# Patient Record
Sex: MALE | Race: WHITE | Employment: FULL TIME | URBAN - METROPOLITAN AREA
[De-identification: names, ages, dates, MRNs, and addresses within clinical notes are randomized per-mention and may not be internally consistent; named-entity substitution may affect disease eponyms.]

---

## 2017-02-27 ENCOUNTER — TELEPHONE (OUTPATIENT)
Dept: INTERNAL MEDICINE CLINIC | Age: 57
End: 2017-02-27

## 2017-02-27 DIAGNOSIS — R97.20 ELEVATED PSA: ICD-10-CM

## 2017-02-27 DIAGNOSIS — Z79.899 ENCOUNTER FOR LONG-TERM (CURRENT) USE OF MEDICATIONS: ICD-10-CM

## 2017-02-27 DIAGNOSIS — Z51.81 ENCOUNTER FOR THERAPEUTIC DRUG MONITORING: ICD-10-CM

## 2017-02-27 DIAGNOSIS — N40.0 PROSTATE HYPERTROPHY: ICD-10-CM

## 2017-02-27 DIAGNOSIS — E78.2 MIXED HYPERLIPIDEMIA: Primary | ICD-10-CM

## 2017-03-09 LAB
A/G RATIO: 1.7 (CALC) (ref 0.8–2.6)
ALBUMIN SERPL-MCNC: 4.5 GM/DL (ref 3.5–5.2)
ALP BLD-CCNC: 63 U/L (ref 23–144)
ALT SERPL-CCNC: 22 U/L (ref 0–60)
AST SERPL-CCNC: 22 U/L (ref 0–46)
BILIRUB SERPL-MCNC: 0.6 MG/DL (ref 0–1.2)
BILIRUBIN DIRECT: 0.1 MG/DL (ref 0–0.4)
BILIRUBIN, INDIRECT: 0.5 MG/DL (CALC) (ref 0–1.2)
BUN / CREAT RATIO: 22 (CALC) (ref 7–25)
BUN BLDV-MCNC: 24 MG/DL (ref 3–29)
CALCIUM SERPL-MCNC: 9.3 MG/DL (ref 8.5–10.5)
CHLORIDE BLD-SCNC: 103 MEQ/L (ref 96–110)
CHOLESTEROL, TOTAL: 213 MG/DL
CO2: 30 MEQ/L (ref 19–32)
CREAT SERPL-MCNC: 1.1 MG/DL
GFR SERPL CREATININE-BSD FRML MDRD: 74 ML/MIN/1.73M2
GLOBULIN: 2.6 GM/DL (CALC) (ref 1.9–3.6)
GLUCOSE BLD-MCNC: 95 MG/DL
HDLC SERPL-MCNC: 47 MG/DL
LDL CHOLESTEROL: 146 MG/DL (CALC)
POTASSIUM SERPL-SCNC: 4.7 MEQ/L (ref 3.4–5.3)
SODIUM BLD-SCNC: 145 MEQ/L (ref 135–148)
TOTAL CK: 117 U/L (ref 0–250)
TOTAL PROTEIN: 7.1 GM/DL (ref 6–8.3)
TRIGL SERPL-MCNC: 101 MG/DL
VLDLC SERPL CALC-MCNC: 20 MG/DL (CALC) (ref 4–38)

## 2017-03-14 ENCOUNTER — TELEPHONE (OUTPATIENT)
Dept: INTERNAL MEDICINE CLINIC | Age: 57
End: 2017-03-14

## 2017-03-15 ENCOUNTER — OFFICE VISIT (OUTPATIENT)
Dept: INTERNAL MEDICINE CLINIC | Age: 57
End: 2017-03-15

## 2017-03-15 VITALS
WEIGHT: 207.2 LBS | HEART RATE: 60 BPM | DIASTOLIC BLOOD PRESSURE: 72 MMHG | SYSTOLIC BLOOD PRESSURE: 118 MMHG | RESPIRATION RATE: 12 BRPM | BODY MASS INDEX: 29.73 KG/M2

## 2017-03-15 DIAGNOSIS — Z23 NEED FOR TDAP VACCINATION: ICD-10-CM

## 2017-03-15 DIAGNOSIS — N40.0 PROSTATE HYPERTROPHY: ICD-10-CM

## 2017-03-15 DIAGNOSIS — E78.2 MIXED HYPERLIPIDEMIA: Primary | ICD-10-CM

## 2017-03-15 PROCEDURE — 90715 TDAP VACCINE 7 YRS/> IM: CPT | Performed by: INTERNAL MEDICINE

## 2017-03-15 PROCEDURE — 90471 IMMUNIZATION ADMIN: CPT | Performed by: INTERNAL MEDICINE

## 2017-03-15 PROCEDURE — 99213 OFFICE O/P EST LOW 20 MIN: CPT | Performed by: INTERNAL MEDICINE

## 2017-03-15 RX ORDER — ROSUVASTATIN CALCIUM 10 MG/1
10 TABLET, COATED ORAL DAILY
Qty: 30 TABLET | Refills: 5 | Status: SHIPPED | OUTPATIENT
Start: 2017-03-15 | End: 2017-09-12 | Stop reason: SDUPTHER

## 2017-03-15 ASSESSMENT — PATIENT HEALTH QUESTIONNAIRE - PHQ9
2. FEELING DOWN, DEPRESSED OR HOPELESS: 0
SUM OF ALL RESPONSES TO PHQ9 QUESTIONS 1 & 2: 0
SUM OF ALL RESPONSES TO PHQ QUESTIONS 1-9: 0
1. LITTLE INTEREST OR PLEASURE IN DOING THINGS: 0

## 2017-03-16 RX ORDER — ACETAMINOPHEN 160 MG
TABLET,DISINTEGRATING ORAL DAILY
COMMUNITY

## 2017-05-03 ENCOUNTER — TELEPHONE (OUTPATIENT)
Dept: INTERNAL MEDICINE CLINIC | Age: 57
End: 2017-05-03

## 2017-05-04 RX ORDER — AZITHROMYCIN 250 MG/1
TABLET, FILM COATED ORAL
Qty: 1 PACKET | Refills: 0 | Status: SHIPPED | OUTPATIENT
Start: 2017-05-04 | End: 2017-05-14

## 2017-05-04 RX ORDER — METHYLPREDNISOLONE 4 MG/1
TABLET ORAL
Qty: 1 KIT | Refills: 0 | Status: SHIPPED | OUTPATIENT
Start: 2017-05-04 | End: 2017-05-10

## 2017-08-28 ENCOUNTER — TELEPHONE (OUTPATIENT)
Dept: INTERNAL MEDICINE CLINIC | Age: 57
End: 2017-08-28

## 2017-08-29 ENCOUNTER — OFFICE VISIT (OUTPATIENT)
Dept: INTERNAL MEDICINE CLINIC | Age: 57
End: 2017-08-29

## 2017-08-29 VITALS
BODY MASS INDEX: 29.35 KG/M2 | DIASTOLIC BLOOD PRESSURE: 88 MMHG | HEIGHT: 70 IN | RESPIRATION RATE: 12 BRPM | SYSTOLIC BLOOD PRESSURE: 110 MMHG | WEIGHT: 205 LBS | HEART RATE: 64 BPM

## 2017-08-29 DIAGNOSIS — Z00.00 ANNUAL PHYSICAL EXAM: Primary | ICD-10-CM

## 2017-08-29 DIAGNOSIS — R00.2 PALPITATIONS: ICD-10-CM

## 2017-08-29 DIAGNOSIS — Z13.6 ENCOUNTER FOR ABDOMINAL AORTIC ANEURYSM (AAA) SCREENING: ICD-10-CM

## 2017-08-29 DIAGNOSIS — E55.9 VITAMIN D DEFICIENCY: ICD-10-CM

## 2017-08-29 LAB
A/G RATIO: 1.5 (ref 1.1–2.2)
ALBUMIN SERPL-MCNC: 4.2 G/DL (ref 3.4–5)
ALP BLD-CCNC: 66 U/L (ref 40–129)
ALT SERPL-CCNC: 27 U/L (ref 10–40)
ANION GAP SERPL CALCULATED.3IONS-SCNC: 18 MMOL/L (ref 3–16)
AST SERPL-CCNC: 21 U/L (ref 15–37)
BASOPHILS ABSOLUTE: 0 K/UL (ref 0–0.2)
BASOPHILS RELATIVE PERCENT: 0.8 %
BILIRUB SERPL-MCNC: 0.5 MG/DL (ref 0–1)
BILIRUBIN URINE: NEGATIVE
BLOOD, URINE: NEGATIVE
BUN BLDV-MCNC: 22 MG/DL (ref 7–20)
C-REACTIVE PROTEIN: 0.4 MG/L (ref 0–5.1)
CALCIUM SERPL-MCNC: 9.2 MG/DL (ref 8.3–10.6)
CHLORIDE BLD-SCNC: 101 MMOL/L (ref 99–110)
CHOLESTEROL, TOTAL: 205 MG/DL (ref 0–199)
CLARITY: CLEAR
CO2: 24 MMOL/L (ref 21–32)
COLOR: YELLOW
CREAT SERPL-MCNC: 1 MG/DL (ref 0.9–1.3)
EOSINOPHILS ABSOLUTE: 0.1 K/UL (ref 0–0.6)
EOSINOPHILS RELATIVE PERCENT: 2.3 %
EPITHELIAL CELLS, UA: 0 /HPF (ref 0–5)
GFR AFRICAN AMERICAN: >60
GFR NON-AFRICAN AMERICAN: >60
GLOBULIN: 2.8 G/DL
GLUCOSE BLD-MCNC: 89 MG/DL (ref 70–99)
GLUCOSE URINE: NEGATIVE MG/DL
HCT VFR BLD CALC: 45.9 % (ref 40.5–52.5)
HDLC SERPL-MCNC: 50 MG/DL (ref 40–60)
HEMOGLOBIN: 15.1 G/DL (ref 13.5–17.5)
HYALINE CASTS: 1 /LPF (ref 0–8)
KETONES, URINE: NEGATIVE MG/DL
LDL CHOLESTEROL CALCULATED: 137 MG/DL
LEUKOCYTE ESTERASE, URINE: NEGATIVE
LYMPHOCYTES ABSOLUTE: 1.6 K/UL (ref 1–5.1)
LYMPHOCYTES RELATIVE PERCENT: 27.9 %
MCH RBC QN AUTO: 30.8 PG (ref 26–34)
MCHC RBC AUTO-ENTMCNC: 33 G/DL (ref 31–36)
MCV RBC AUTO: 93.4 FL (ref 80–100)
MICROSCOPIC EXAMINATION: NORMAL
MONOCYTES ABSOLUTE: 0.4 K/UL (ref 0–1.3)
MONOCYTES RELATIVE PERCENT: 7.7 %
NEUTROPHILS ABSOLUTE: 3.5 K/UL (ref 1.7–7.7)
NEUTROPHILS RELATIVE PERCENT: 61.3 %
NITRITE, URINE: NEGATIVE
PDW BLD-RTO: 13.7 % (ref 12.4–15.4)
PH UA: 6
PLATELET # BLD: 195 K/UL (ref 135–450)
PMV BLD AUTO: 9.3 FL (ref 5–10.5)
POTASSIUM SERPL-SCNC: 4 MMOL/L (ref 3.5–5.1)
PROSTATE SPECIFIC ANTIGEN: 5.22 NG/ML (ref 0–4)
PROTEIN UA: NEGATIVE MG/DL
RBC # BLD: 4.92 M/UL (ref 4.2–5.9)
RBC UA: 2 /HPF (ref 0–4)
SODIUM BLD-SCNC: 143 MMOL/L (ref 136–145)
SPECIFIC GRAVITY UA: 1.02
TOTAL CK: 93 U/L (ref 39–308)
TOTAL PROTEIN: 7 G/DL (ref 6.4–8.2)
TRIGL SERPL-MCNC: 92 MG/DL (ref 0–150)
TSH SERPL DL<=0.05 MIU/L-ACNC: 0.56 UIU/ML (ref 0.27–4.2)
UROBILINOGEN, URINE: 0.2 E.U./DL
VITAMIN D 25-HYDROXY: 32.8 NG/ML
VLDLC SERPL CALC-MCNC: 18 MG/DL
WBC # BLD: 5.6 K/UL (ref 4–11)
WBC UA: 1 /HPF (ref 0–5)

## 2017-08-29 PROCEDURE — 36415 COLL VENOUS BLD VENIPUNCTURE: CPT | Performed by: INTERNAL MEDICINE

## 2017-08-29 PROCEDURE — 93000 ELECTROCARDIOGRAM COMPLETE: CPT | Performed by: INTERNAL MEDICINE

## 2017-08-29 PROCEDURE — 99396 PREV VISIT EST AGE 40-64: CPT | Performed by: INTERNAL MEDICINE

## 2017-08-29 PROCEDURE — 81001 URINALYSIS AUTO W/SCOPE: CPT | Performed by: INTERNAL MEDICINE

## 2017-08-30 LAB
ESTIMATED AVERAGE GLUCOSE: 111.2 MG/DL
HBA1C MFR BLD: 5.5 %

## 2017-09-11 ENCOUNTER — TELEPHONE (OUTPATIENT)
Dept: INTERNAL MEDICINE CLINIC | Age: 57
End: 2017-09-11

## 2017-09-12 ENCOUNTER — OFFICE VISIT (OUTPATIENT)
Dept: INTERNAL MEDICINE CLINIC | Age: 57
End: 2017-09-12

## 2017-09-12 VITALS — DIASTOLIC BLOOD PRESSURE: 70 MMHG | SYSTOLIC BLOOD PRESSURE: 110 MMHG | HEART RATE: 74 BPM

## 2017-09-12 DIAGNOSIS — E78.2 MIXED HYPERLIPIDEMIA: ICD-10-CM

## 2017-09-12 DIAGNOSIS — R97.20 ELEVATED PROSTATE SPECIFIC ANTIGEN (PSA): Primary | ICD-10-CM

## 2017-09-12 DIAGNOSIS — G47.33 OBSTRUCTIVE SLEEP APNEA SYNDROME: ICD-10-CM

## 2017-09-12 PROCEDURE — 36415 COLL VENOUS BLD VENIPUNCTURE: CPT | Performed by: INTERNAL MEDICINE

## 2017-09-12 PROCEDURE — 99213 OFFICE O/P EST LOW 20 MIN: CPT | Performed by: INTERNAL MEDICINE

## 2017-09-12 RX ORDER — ROSUVASTATIN CALCIUM 10 MG/1
10 TABLET, COATED ORAL DAILY
Qty: 30 TABLET | Refills: 5 | Status: SHIPPED | OUTPATIENT
Start: 2017-09-12 | End: 2018-07-31 | Stop reason: SDUPTHER

## 2017-09-13 LAB — PROSTATE SPECIFIC ANTIGEN: 4.27 NG/ML (ref 0–4)

## 2017-09-19 ENCOUNTER — TELEPHONE (OUTPATIENT)
Dept: INTERNAL MEDICINE CLINIC | Age: 57
End: 2017-09-19

## 2017-09-19 DIAGNOSIS — R97.20 ELEVATED PROSTATE SPECIFIC ANTIGEN (PSA): Primary | ICD-10-CM

## 2017-09-26 ENCOUNTER — HOSPITAL ENCOUNTER (OUTPATIENT)
Dept: ULTRASOUND IMAGING | Age: 57
Discharge: HOME | End: 2017-09-26
Attending: INTERNAL MEDICINE | Admitting: INTERNAL MEDICINE

## 2017-09-26 DIAGNOSIS — Z13.6 SCREENING FOR ABDOMINAL AORTIC ANEURYSM (AAA) PERFORMED: ICD-10-CM

## 2018-01-12 ENCOUNTER — TELEPHONE (OUTPATIENT)
Dept: INTERNAL MEDICINE CLINIC | Age: 58
End: 2018-01-12

## 2018-01-12 RX ORDER — OSELTAMIVIR PHOSPHATE 75 MG/1
75 CAPSULE ORAL 2 TIMES DAILY
Qty: 10 CAPSULE | Refills: 0 | Status: SHIPPED | OUTPATIENT
Start: 2018-01-12 | End: 2018-01-17

## 2018-02-02 ENCOUNTER — TELEPHONE (OUTPATIENT)
Dept: INTERNAL MEDICINE CLINIC | Age: 58
End: 2018-02-02

## 2018-02-02 RX ORDER — AZITHROMYCIN 250 MG/1
TABLET, FILM COATED ORAL
Qty: 1 PACKET | Refills: 0 | Status: SHIPPED | OUTPATIENT
Start: 2018-02-02 | End: 2018-02-12

## 2018-02-02 RX ORDER — METHYLPREDNISOLONE 4 MG/1
TABLET ORAL
Qty: 1 KIT | Refills: 0 | Status: SHIPPED | OUTPATIENT
Start: 2018-02-02 | End: 2018-02-08

## 2018-02-02 NOTE — TELEPHONE ENCOUNTER
Wife called and stated that she thinks Neena Briones needs a zpac and something for his cough.     [place]

## 2018-02-28 DIAGNOSIS — Z79.899 ENCOUNTER FOR LONG-TERM (CURRENT) USE OF MEDICATIONS: ICD-10-CM

## 2018-02-28 DIAGNOSIS — E78.2 MIXED HYPERLIPIDEMIA: Primary | ICD-10-CM

## 2018-02-28 DIAGNOSIS — D50.9 IRON DEFICIENCY ANEMIA, UNSPECIFIED IRON DEFICIENCY ANEMIA TYPE: ICD-10-CM

## 2018-04-04 ENCOUNTER — TELEPHONE (OUTPATIENT)
Dept: INTERNAL MEDICINE CLINIC | Age: 58
End: 2018-04-04

## 2018-04-04 RX ORDER — AZITHROMYCIN 250 MG/1
TABLET, FILM COATED ORAL
Qty: 1 PACKET | Refills: 0 | Status: SHIPPED | OUTPATIENT
Start: 2018-04-04 | End: 2018-04-14

## 2018-04-05 ENCOUNTER — OFFICE VISIT (OUTPATIENT)
Dept: INTERNAL MEDICINE CLINIC | Age: 58
End: 2018-04-05

## 2018-04-05 VITALS
BODY MASS INDEX: 29.82 KG/M2 | DIASTOLIC BLOOD PRESSURE: 68 MMHG | SYSTOLIC BLOOD PRESSURE: 110 MMHG | HEART RATE: 60 BPM | WEIGHT: 207.8 LBS

## 2018-04-05 DIAGNOSIS — J98.01 BRONCHOSPASM: ICD-10-CM

## 2018-04-05 DIAGNOSIS — J40 BRONCHITIS: Primary | ICD-10-CM

## 2018-04-05 DIAGNOSIS — E78.2 MIXED HYPERLIPIDEMIA: ICD-10-CM

## 2018-04-05 DIAGNOSIS — R97.20 ELEVATED PROSTATE SPECIFIC ANTIGEN (PSA): ICD-10-CM

## 2018-04-05 PROCEDURE — 99213 OFFICE O/P EST LOW 20 MIN: CPT | Performed by: INTERNAL MEDICINE

## 2018-04-05 RX ORDER — METHYLPREDNISOLONE 4 MG/1
TABLET ORAL
Qty: 1 KIT | Refills: 0 | Status: SHIPPED | OUTPATIENT
Start: 2018-04-05 | End: 2018-04-11

## 2018-04-05 ASSESSMENT — PATIENT HEALTH QUESTIONNAIRE - PHQ9
SUM OF ALL RESPONSES TO PHQ9 QUESTIONS 1 & 2: 0
1. LITTLE INTEREST OR PLEASURE IN DOING THINGS: 0
SUM OF ALL RESPONSES TO PHQ QUESTIONS 1-9: 0
2. FEELING DOWN, DEPRESSED OR HOPELESS: 0

## 2018-07-31 ENCOUNTER — TELEPHONE (OUTPATIENT)
Dept: INTERNAL MEDICINE CLINIC | Age: 58
End: 2018-07-31

## 2018-07-31 RX ORDER — ROSUVASTATIN CALCIUM 10 MG/1
10 TABLET, COATED ORAL DAILY
Qty: 90 TABLET | Refills: 3 | Status: SHIPPED | OUTPATIENT
Start: 2018-07-31 | End: 2019-11-01 | Stop reason: SDUPTHER

## 2018-07-31 NOTE — TELEPHONE ENCOUNTER
Patient needs refill on crestor sent to [facility] on [facility].

## 2018-10-25 ENCOUNTER — OFFICE VISIT (OUTPATIENT)
Dept: INTERNAL MEDICINE CLINIC | Age: 58
End: 2018-10-25
Payer: COMMERCIAL

## 2018-10-25 VITALS
SYSTOLIC BLOOD PRESSURE: 118 MMHG | HEART RATE: 70 BPM | HEIGHT: 70 IN | BODY MASS INDEX: 29.78 KG/M2 | WEIGHT: 208 LBS | DIASTOLIC BLOOD PRESSURE: 70 MMHG

## 2018-10-25 DIAGNOSIS — E55.9 VITAMIN D DEFICIENCY: ICD-10-CM

## 2018-10-25 DIAGNOSIS — Z00.00 ANNUAL PHYSICAL EXAM: Primary | ICD-10-CM

## 2018-10-25 DIAGNOSIS — R00.2 PALPITATIONS: ICD-10-CM

## 2018-10-25 DIAGNOSIS — Z23 NEED FOR PROPHYLACTIC VACCINATION AND INOCULATION AGAINST VARICELLA: ICD-10-CM

## 2018-10-25 LAB
A/G RATIO: 1.6 (ref 1.1–2.2)
ALBUMIN SERPL-MCNC: 4.5 G/DL (ref 3.4–5)
ALP BLD-CCNC: 67 U/L (ref 40–129)
ALT SERPL-CCNC: 22 U/L (ref 10–40)
ANION GAP SERPL CALCULATED.3IONS-SCNC: 15 MMOL/L (ref 3–16)
AST SERPL-CCNC: 21 U/L (ref 15–37)
BASOPHILS ABSOLUTE: 0.1 K/UL (ref 0–0.2)
BASOPHILS RELATIVE PERCENT: 1 %
BILIRUB SERPL-MCNC: 0.4 MG/DL (ref 0–1)
BILIRUBIN URINE: NEGATIVE
BLOOD, URINE: NEGATIVE
BUN BLDV-MCNC: 19 MG/DL (ref 7–20)
CALCIUM SERPL-MCNC: 9.6 MG/DL (ref 8.3–10.6)
CHLORIDE BLD-SCNC: 104 MMOL/L (ref 99–110)
CHOLESTEROL, TOTAL: 180 MG/DL (ref 0–199)
CLARITY: CLEAR
CO2: 26 MMOL/L (ref 21–32)
COLOR: YELLOW
CREAT SERPL-MCNC: 1.1 MG/DL (ref 0.8–1.3)
EOSINOPHILS ABSOLUTE: 0.4 K/UL (ref 0–0.6)
EOSINOPHILS RELATIVE PERCENT: 7 %
EPITHELIAL CELLS, UA: 0 /HPF (ref 0–5)
GFR AFRICAN AMERICAN: >60
GFR NON-AFRICAN AMERICAN: >60
GLOBULIN: 2.8 G/DL
GLUCOSE BLD-MCNC: 104 MG/DL (ref 70–99)
GLUCOSE URINE: NEGATIVE MG/DL
HCT VFR BLD CALC: 46.4 % (ref 40.5–52.5)
HDLC SERPL-MCNC: 49 MG/DL (ref 40–60)
HEMOGLOBIN: 15.4 G/DL (ref 13.5–17.5)
HYALINE CASTS: 0 /LPF (ref 0–8)
KETONES, URINE: NEGATIVE MG/DL
LDL CHOLESTEROL CALCULATED: 116 MG/DL
LEUKOCYTE ESTERASE, URINE: NEGATIVE
LYMPHOCYTES ABSOLUTE: 1.4 K/UL (ref 1–5.1)
LYMPHOCYTES RELATIVE PERCENT: 24.6 %
MCH RBC QN AUTO: 31.4 PG (ref 26–34)
MCHC RBC AUTO-ENTMCNC: 33.1 G/DL (ref 31–36)
MCV RBC AUTO: 95 FL (ref 80–100)
MICROSCOPIC EXAMINATION: YES
MONOCYTES ABSOLUTE: 0.4 K/UL (ref 0–1.3)
MONOCYTES RELATIVE PERCENT: 7.8 %
NEUTROPHILS ABSOLUTE: 3.4 K/UL (ref 1.7–7.7)
NEUTROPHILS RELATIVE PERCENT: 59.6 %
NITRITE, URINE: NEGATIVE
PDW BLD-RTO: 13.5 % (ref 12.4–15.4)
PH UA: 6
PLATELET # BLD: 169 K/UL (ref 135–450)
PMV BLD AUTO: 9.9 FL (ref 5–10.5)
POTASSIUM SERPL-SCNC: 4.5 MMOL/L (ref 3.5–5.1)
PROSTATE SPECIFIC ANTIGEN: 3.41 NG/ML (ref 0–4)
PROTEIN UA: ABNORMAL MG/DL
RBC # BLD: 4.89 M/UL (ref 4.2–5.9)
RBC UA: 3 /HPF (ref 0–4)
SODIUM BLD-SCNC: 145 MMOL/L (ref 136–145)
SPECIFIC GRAVITY UA: 1.03
TOTAL CK: 131 U/L (ref 39–308)
TOTAL PROTEIN: 7.3 G/DL (ref 6.4–8.2)
TRIGL SERPL-MCNC: 74 MG/DL (ref 0–150)
TSH SERPL DL<=0.05 MIU/L-ACNC: 0.68 UIU/ML (ref 0.27–4.2)
UROBILINOGEN, URINE: 0.2 E.U./DL
VLDLC SERPL CALC-MCNC: 15 MG/DL
WBC # BLD: 5.7 K/UL (ref 4–11)
WBC UA: 2 /HPF (ref 0–5)

## 2018-10-25 PROCEDURE — 99396 PREV VISIT EST AGE 40-64: CPT | Performed by: INTERNAL MEDICINE

## 2018-10-25 PROCEDURE — 93000 ELECTROCARDIOGRAM COMPLETE: CPT | Performed by: INTERNAL MEDICINE

## 2018-10-25 NOTE — PATIENT INSTRUCTIONS
Nasacort Nasal Spray, do one spray each nostril a day as needed for congestion
Authored by Resident/PA/NP

## 2018-10-26 LAB
ESTIMATED AVERAGE GLUCOSE: 116.9 MG/DL
HBA1C MFR BLD: 5.7 %
VITAMIN D 25-HYDROXY: 30.8 NG/ML

## 2019-03-06 ENCOUNTER — TELEPHONE (OUTPATIENT)
Dept: INTERNAL MEDICINE CLINIC | Age: 59
End: 2019-03-06

## 2019-03-06 RX ORDER — AZITHROMYCIN 250 MG/1
TABLET, FILM COATED ORAL
Qty: 1 PACKET | Refills: 0 | Status: SHIPPED | OUTPATIENT
Start: 2019-03-06 | End: 2019-03-16

## 2019-03-08 ENCOUNTER — TELEPHONE (OUTPATIENT)
Dept: INTERNAL MEDICINE CLINIC | Age: 59
End: 2019-03-08

## 2019-03-08 RX ORDER — BENZONATATE 100 MG/1
100 CAPSULE ORAL 3 TIMES DAILY PRN
Qty: 30 CAPSULE | Refills: 1 | Status: SHIPPED | OUTPATIENT
Start: 2019-03-08

## 2019-03-08 RX ORDER — ALBUTEROL SULFATE 90 UG/1
1 AEROSOL, METERED RESPIRATORY (INHALATION) EVERY 4 HOURS PRN
Qty: 1 INHALER | Refills: 0 | Status: SHIPPED | OUTPATIENT
Start: 2019-03-08

## 2019-04-23 ENCOUNTER — TELEPHONE (OUTPATIENT)
Dept: INTERNAL MEDICINE CLINIC | Age: 59
End: 2019-04-23

## 2019-04-23 DIAGNOSIS — D50.9 IRON DEFICIENCY ANEMIA, UNSPECIFIED IRON DEFICIENCY ANEMIA TYPE: ICD-10-CM

## 2019-04-23 DIAGNOSIS — E78.2 MIXED HYPERLIPIDEMIA: Primary | ICD-10-CM

## 2019-10-28 ENCOUNTER — TELEPHONE (OUTPATIENT)
Dept: INTERNAL MEDICINE CLINIC | Age: 59
End: 2019-10-28

## 2019-10-28 RX ORDER — AZITHROMYCIN 250 MG/1
TABLET, FILM COATED ORAL
Qty: 1 PACKET | Refills: 0 | Status: SHIPPED | OUTPATIENT
Start: 2019-10-28 | End: 2019-11-07

## 2019-10-28 RX ORDER — METHYLPREDNISOLONE 4 MG/1
TABLET ORAL
Qty: 1 KIT | Refills: 0 | Status: SHIPPED | OUTPATIENT
Start: 2019-10-28 | End: 2019-11-03

## 2019-10-29 DIAGNOSIS — J40 BRONCHITIS: Primary | ICD-10-CM

## 2019-10-29 RX ORDER — PROMETHAZINE HYDROCHLORIDE AND CODEINE PHOSPHATE 6.25; 1 MG/5ML; MG/5ML
5 SYRUP ORAL EVERY 8 HOURS PRN
Qty: 105 ML | Refills: 0 | Status: SHIPPED | OUTPATIENT
Start: 2019-10-29 | End: 2019-11-05

## 2019-11-01 ENCOUNTER — OFFICE VISIT (OUTPATIENT)
Dept: INTERNAL MEDICINE CLINIC | Age: 59
End: 2019-11-01
Payer: COMMERCIAL

## 2019-11-01 VITALS
DIASTOLIC BLOOD PRESSURE: 70 MMHG | WEIGHT: 205 LBS | HEIGHT: 70 IN | HEART RATE: 72 BPM | SYSTOLIC BLOOD PRESSURE: 110 MMHG | BODY MASS INDEX: 29.35 KG/M2

## 2019-11-01 DIAGNOSIS — R00.2 PALPITATIONS: ICD-10-CM

## 2019-11-01 DIAGNOSIS — L98.9 FACIAL SKIN LESION: ICD-10-CM

## 2019-11-01 DIAGNOSIS — E55.9 VITAMIN D DEFICIENCY: ICD-10-CM

## 2019-11-01 DIAGNOSIS — Z00.00 ANNUAL PHYSICAL EXAM: Primary | ICD-10-CM

## 2019-11-01 PROCEDURE — 93000 ELECTROCARDIOGRAM COMPLETE: CPT | Performed by: INTERNAL MEDICINE

## 2019-11-01 PROCEDURE — 99396 PREV VISIT EST AGE 40-64: CPT | Performed by: INTERNAL MEDICINE

## 2019-11-01 RX ORDER — ROSUVASTATIN CALCIUM 10 MG/1
10 TABLET, COATED ORAL DAILY
Qty: 90 TABLET | Refills: 3 | Status: SHIPPED | OUTPATIENT
Start: 2019-11-01

## 2019-11-01 ASSESSMENT — PATIENT HEALTH QUESTIONNAIRE - PHQ9
1. LITTLE INTEREST OR PLEASURE IN DOING THINGS: 0
SUM OF ALL RESPONSES TO PHQ QUESTIONS 1-9: 0
SUM OF ALL RESPONSES TO PHQ9 QUESTIONS 1 & 2: 0
2. FEELING DOWN, DEPRESSED OR HOPELESS: 0
SUM OF ALL RESPONSES TO PHQ QUESTIONS 1-9: 0

## 2019-11-08 DIAGNOSIS — E55.9 VITAMIN D DEFICIENCY: ICD-10-CM

## 2019-11-08 DIAGNOSIS — Z00.00 ANNUAL PHYSICAL EXAM: ICD-10-CM

## 2019-11-08 LAB
A/G RATIO: 2.4 (ref 1.1–2.2)
ALBUMIN SERPL-MCNC: 4.7 G/DL (ref 3.4–5)
ALP BLD-CCNC: 61 U/L (ref 40–129)
ALT SERPL-CCNC: 18 U/L (ref 10–40)
ANION GAP SERPL CALCULATED.3IONS-SCNC: 13 MMOL/L (ref 3–16)
AST SERPL-CCNC: 17 U/L (ref 15–37)
BASOPHILS ABSOLUTE: 0 K/UL (ref 0–0.2)
BASOPHILS RELATIVE PERCENT: 0.7 %
BILIRUB SERPL-MCNC: 0.7 MG/DL (ref 0–1)
BILIRUBIN URINE: NEGATIVE
BLOOD, URINE: NEGATIVE
BUN BLDV-MCNC: 21 MG/DL (ref 7–20)
CALCIUM SERPL-MCNC: 9.1 MG/DL (ref 8.3–10.6)
CHLORIDE BLD-SCNC: 104 MMOL/L (ref 99–110)
CHOLESTEROL, TOTAL: 219 MG/DL (ref 0–199)
CLARITY: CLEAR
CO2: 26 MMOL/L (ref 21–32)
COLOR: YELLOW
CREAT SERPL-MCNC: 1.1 MG/DL (ref 0.8–1.3)
EOSINOPHILS ABSOLUTE: 0.2 K/UL (ref 0–0.6)
EOSINOPHILS RELATIVE PERCENT: 2.9 %
EPITHELIAL CELLS, UA: 0 /HPF (ref 0–5)
GFR AFRICAN AMERICAN: >60
GFR NON-AFRICAN AMERICAN: >60
GLOBULIN: 2 G/DL
GLUCOSE BLD-MCNC: 95 MG/DL (ref 70–99)
GLUCOSE URINE: NEGATIVE MG/DL
HCT VFR BLD CALC: 44.7 % (ref 40.5–52.5)
HDLC SERPL-MCNC: 45 MG/DL (ref 40–60)
HEMOGLOBIN: 15 G/DL (ref 13.5–17.5)
HYALINE CASTS: 1 /LPF (ref 0–8)
KETONES, URINE: NEGATIVE MG/DL
LDL CHOLESTEROL CALCULATED: 151 MG/DL
LEUKOCYTE ESTERASE, URINE: NEGATIVE
LYMPHOCYTES ABSOLUTE: 1.5 K/UL (ref 1–5.1)
LYMPHOCYTES RELATIVE PERCENT: 26.4 %
MCH RBC QN AUTO: 31.5 PG (ref 26–34)
MCHC RBC AUTO-ENTMCNC: 33.7 G/DL (ref 31–36)
MCV RBC AUTO: 93.7 FL (ref 80–100)
MICROSCOPIC EXAMINATION: YES
MONOCYTES ABSOLUTE: 0.5 K/UL (ref 0–1.3)
MONOCYTES RELATIVE PERCENT: 8.5 %
NEUTROPHILS ABSOLUTE: 3.5 K/UL (ref 1.7–7.7)
NEUTROPHILS RELATIVE PERCENT: 61.5 %
NITRITE, URINE: NEGATIVE
PDW BLD-RTO: 13.3 % (ref 12.4–15.4)
PH UA: 7 (ref 5–8)
PLATELET # BLD: 165 K/UL (ref 135–450)
PMV BLD AUTO: 9.9 FL (ref 5–10.5)
POTASSIUM SERPL-SCNC: 4.2 MMOL/L (ref 3.5–5.1)
PROSTATE SPECIFIC ANTIGEN: 5.21 NG/ML (ref 0–4)
PROTEIN UA: ABNORMAL MG/DL
RBC # BLD: 4.77 M/UL (ref 4.2–5.9)
RBC UA: 2 /HPF (ref 0–4)
SODIUM BLD-SCNC: 143 MMOL/L (ref 136–145)
SPECIFIC GRAVITY UA: 1.02 (ref 1–1.03)
TOTAL CK: 87 U/L (ref 39–308)
TOTAL PROTEIN: 6.7 G/DL (ref 6.4–8.2)
TRIGL SERPL-MCNC: 115 MG/DL (ref 0–150)
TSH SERPL DL<=0.05 MIU/L-ACNC: 0.56 UIU/ML (ref 0.27–4.2)
URINE TYPE: ABNORMAL
UROBILINOGEN, URINE: 0.2 E.U./DL
VITAMIN D 25-HYDROXY: 28.6 NG/ML
VLDLC SERPL CALC-MCNC: 23 MG/DL
WBC # BLD: 5.7 K/UL (ref 4–11)
WBC UA: 1 /HPF (ref 0–5)

## 2019-11-09 LAB
ESTIMATED AVERAGE GLUCOSE: 119.8 MG/DL
HBA1C MFR BLD: 5.8 %

## 2020-03-17 ENCOUNTER — TELEPHONE (OUTPATIENT)
Dept: INTERNAL MEDICINE CLINIC | Age: 60
End: 2020-03-17

## 2020-03-17 RX ORDER — PREDNISONE 10 MG/1
10 TABLET ORAL DAILY
Qty: 10 TABLET | Refills: 0 | Status: SHIPPED | OUTPATIENT
Start: 2020-03-17 | End: 2020-03-27

## 2020-03-17 RX ORDER — AZITHROMYCIN 250 MG/1
250 TABLET, FILM COATED ORAL SEE ADMIN INSTRUCTIONS
Qty: 6 TABLET | Refills: 0 | Status: SHIPPED | OUTPATIENT
Start: 2020-03-17 | End: 2020-03-22

## 2022-09-16 ENCOUNTER — TRANSCRIBE ORDERS (OUTPATIENT)
Dept: ADMINISTRATIVE | Age: 62
End: 2022-09-16

## 2022-09-16 DIAGNOSIS — N50.9 DISEASE OF SEMINAL VESICLE: Primary | ICD-10-CM

## 2022-09-27 ENCOUNTER — HOSPITAL ENCOUNTER (OUTPATIENT)
Dept: ULTRASOUND IMAGING | Age: 62
Discharge: HOME | End: 2022-09-27
Payer: COMMERCIAL

## 2022-09-27 DIAGNOSIS — N50.9 DISEASE OF SEMINAL VESICLE: ICD-10-CM

## 2022-09-27 PROCEDURE — 76870 US EXAM SCROTUM: CPT

## 2022-09-27 PROCEDURE — 93975 VASCULAR STUDY: CPT

## 2023-11-10 SDOH — HEALTH STABILITY: PHYSICAL HEALTH: ON AVERAGE, HOW MANY DAYS PER WEEK DO YOU ENGAGE IN MODERATE TO STRENUOUS EXERCISE (LIKE A BRISK WALK)?: 2 DAYS

## 2023-11-13 ENCOUNTER — OFFICE VISIT (OUTPATIENT)
Dept: INTERNAL MEDICINE CLINIC | Age: 63
End: 2023-11-13
Payer: COMMERCIAL

## 2023-11-13 VITALS
SYSTOLIC BLOOD PRESSURE: 114 MMHG | HEIGHT: 70 IN | OXYGEN SATURATION: 99 % | BODY MASS INDEX: 28.15 KG/M2 | RESPIRATION RATE: 16 BRPM | DIASTOLIC BLOOD PRESSURE: 72 MMHG | HEART RATE: 68 BPM | WEIGHT: 196.6 LBS

## 2023-11-13 DIAGNOSIS — R73.03 PRE-DIABETES: ICD-10-CM

## 2023-11-13 DIAGNOSIS — E55.9 VITAMIN D DEFICIENCY: ICD-10-CM

## 2023-11-13 DIAGNOSIS — R97.20 ELEVATED PSA: ICD-10-CM

## 2023-11-13 DIAGNOSIS — E78.2 MIXED HYPERLIPIDEMIA: ICD-10-CM

## 2023-11-13 DIAGNOSIS — Z00.00 ENCOUNTER FOR MEDICAL EXAMINATION TO ESTABLISH CARE: ICD-10-CM

## 2023-11-13 DIAGNOSIS — Z00.00 ENCOUNTER FOR MEDICAL EXAMINATION TO ESTABLISH CARE: Primary | ICD-10-CM

## 2023-11-13 PROCEDURE — 36415 COLL VENOUS BLD VENIPUNCTURE: CPT | Performed by: NURSE PRACTITIONER

## 2023-11-13 PROCEDURE — 99204 OFFICE O/P NEW MOD 45 MIN: CPT | Performed by: NURSE PRACTITIONER

## 2023-11-13 PROCEDURE — 1123F ACP DISCUSS/DSCN MKR DOCD: CPT | Performed by: NURSE PRACTITIONER

## 2023-11-13 SDOH — ECONOMIC STABILITY: INCOME INSECURITY: HOW HARD IS IT FOR YOU TO PAY FOR THE VERY BASICS LIKE FOOD, HOUSING, MEDICAL CARE, AND HEATING?: NOT HARD AT ALL

## 2023-11-13 SDOH — ECONOMIC STABILITY: HOUSING INSECURITY
IN THE LAST 12 MONTHS, WAS THERE A TIME WHEN YOU DID NOT HAVE A STEADY PLACE TO SLEEP OR SLEPT IN A SHELTER (INCLUDING NOW)?: NO

## 2023-11-13 SDOH — ECONOMIC STABILITY: FOOD INSECURITY: WITHIN THE PAST 12 MONTHS, THE FOOD YOU BOUGHT JUST DIDN'T LAST AND YOU DIDN'T HAVE MONEY TO GET MORE.: NEVER TRUE

## 2023-11-13 SDOH — ECONOMIC STABILITY: FOOD INSECURITY: WITHIN THE PAST 12 MONTHS, YOU WORRIED THAT YOUR FOOD WOULD RUN OUT BEFORE YOU GOT MONEY TO BUY MORE.: NEVER TRUE

## 2023-11-13 ASSESSMENT — ENCOUNTER SYMPTOMS
CONSTIPATION: 0
WHEEZING: 0
ABDOMINAL PAIN: 0
NAUSEA: 0
SORE THROAT: 0
ABDOMINAL DISTENTION: 0
DIARRHEA: 0
SHORTNESS OF BREATH: 0
SINUS PAIN: 0
SINUS PRESSURE: 0
COLOR CHANGE: 0
COUGH: 0
CHEST TIGHTNESS: 0
EYES NEGATIVE: 1

## 2023-11-13 ASSESSMENT — PATIENT HEALTH QUESTIONNAIRE - PHQ9
SUM OF ALL RESPONSES TO PHQ QUESTIONS 1-9: 0
SUM OF ALL RESPONSES TO PHQ QUESTIONS 1-9: 0
1. LITTLE INTEREST OR PLEASURE IN DOING THINGS: 0
SUM OF ALL RESPONSES TO PHQ QUESTIONS 1-9: 0
DEPRESSION UNABLE TO ASSESS: FUNCTIONAL CAPACITY MOTIVATION LIMITS ACCURACY
SUM OF ALL RESPONSES TO PHQ9 QUESTIONS 1 & 2: 0
SUM OF ALL RESPONSES TO PHQ QUESTIONS 1-9: 0
2. FEELING DOWN, DEPRESSED OR HOPELESS: 0

## 2023-11-13 NOTE — PROGRESS NOTES
Shira Quiñonez   70 y.o.  male  [id number]      Chief Complaint   Patient presents with    New Patient        Subjective:  Patient is here to establish care. He was a previous patient of Dr Amina Kiser, last being seen November of 2019. Patient has a history of mixed hyperlipidemia, elevated PSA, Pre-DM; and current complaints are as below. Health maintenance reviewed with patient. Patient does not smoke. Patient does drink alcohol occasionally. Patient  does not use drugs. Hemoglobin A1C   Date Value Ref Range Status   11/08/2019 5.8 See comment % Final     Comment:     Comment:  Diagnosis of Diabetes: > or = 6.5%  Increased risk of diabetes (Prediabetes): 5.7-6.4%  Glycemic Control: Nonpregnant Adults: <7.0%                    Pregnant: <6.0%         Was previously on Crestor, but has not been on any statin or other lipid lowering drug in at least 3 years. Does have hx of sibling with CAD passing in his early 66's. Lab Results   Component Value Date    LDLCALC 151 (H) 11/08/2019    LDLCHOLESTEROL 146 (H) 03/09/2017     PSA noted to be previously elevated, however, has never seen urology for this issue. Does see Dr Aleena Armstrong for his peyronie's disease. Denies any urinary symptoms aside from mild nocturia from time to time. Vit D- previously was on 2000 iu daily. Has not taken this for a few years. Patient's past medical, surgical, social and/or family history reviewed, updated in chart. Medications and allergies also reviewed and updatedin chart.      -defers flu/pneumonia boosters at this time. Review of Systems   Constitutional:  Negative for activity change, appetite change, fatigue and fever. HENT:  Negative for congestion, sinus pressure, sinus pain and sore throat. Eyes: Negative. Respiratory:  Negative for cough, chest tightness, shortness of breath and wheezing. Cardiovascular:  Negative for chest pain and palpitations.    Gastrointestinal:  Negative for abdominal

## 2023-11-14 LAB
25(OH)D3 SERPL-MCNC: 31.9 NG/ML
ALBUMIN SERPL-MCNC: 4.3 G/DL (ref 3.4–5)
ALBUMIN/GLOB SERPL: 1.6 {RATIO} (ref 1.1–2.2)
ALP SERPL-CCNC: 69 U/L (ref 40–129)
ALT SERPL-CCNC: 13 U/L (ref 10–40)
ANION GAP SERPL CALCULATED.3IONS-SCNC: 13 MMOL/L (ref 3–16)
AST SERPL-CCNC: 17 U/L (ref 15–37)
BASOPHILS # BLD: 0 K/UL (ref 0–0.2)
BASOPHILS NFR BLD: 0 %
BILIRUB SERPL-MCNC: 0.5 MG/DL (ref 0–1)
BUN SERPL-MCNC: 28 MG/DL (ref 7–20)
CALCIUM SERPL-MCNC: 9.6 MG/DL (ref 8.3–10.6)
CHLORIDE SERPL-SCNC: 104 MMOL/L (ref 99–110)
CHOLEST SERPL-MCNC: 250 MG/DL (ref 0–199)
CO2 SERPL-SCNC: 24 MMOL/L (ref 21–32)
CREAT SERPL-MCNC: 1.3 MG/DL (ref 0.8–1.3)
DEPRECATED RDW RBC AUTO: 13 % (ref 12.4–15.4)
EOSINOPHIL # BLD: 0.2 K/UL (ref 0–0.6)
EOSINOPHIL NFR BLD: 3 %
EST. AVERAGE GLUCOSE BLD GHB EST-MCNC: 114 MG/DL
GFR SERPLBLD CREATININE-BSD FMLA CKD-EPI: >60 ML/MIN/{1.73_M2}
GLUCOSE SERPL-MCNC: 86 MG/DL (ref 70–99)
HBA1C MFR BLD: 5.6 %
HCT VFR BLD AUTO: 45.6 % (ref 40.5–52.5)
HDLC SERPL-MCNC: 47 MG/DL (ref 40–60)
HGB BLD-MCNC: 15.6 G/DL (ref 13.5–17.5)
LDL CHOLESTEROL CALCULATED: 174 MG/DL
LYMPHOCYTES # BLD: 2.3 K/UL (ref 1–5.1)
LYMPHOCYTES NFR BLD: 28 %
MCH RBC QN AUTO: 31.9 PG (ref 26–34)
MCHC RBC AUTO-ENTMCNC: 34.3 G/DL (ref 31–36)
MCV RBC AUTO: 93.1 FL (ref 80–100)
MONOCYTES # BLD: 0.7 K/UL (ref 0–1.3)
MONOCYTES NFR BLD: 10 %
NEUTROPHILS # BLD: 4 K/UL (ref 1.7–7.7)
NEUTROPHILS NFR BLD: 49 %
NEUTS BAND NFR BLD MANUAL: 6 % (ref 0–7)
PLATELET # BLD AUTO: 186 K/UL (ref 135–450)
PLATELET BLD QL SMEAR: ADEQUATE
PMV BLD AUTO: 11 FL (ref 5–10.5)
POTASSIUM SERPL-SCNC: 4.4 MMOL/L (ref 3.5–5.1)
PROT SERPL-MCNC: 7 G/DL (ref 6.4–8.2)
PSA SERPL DL<=0.01 NG/ML-MCNC: 4.98 NG/ML (ref 0–4)
RBC # BLD AUTO: 4.9 M/UL (ref 4.2–5.9)
SLIDE REVIEW: ABNORMAL
SODIUM SERPL-SCNC: 141 MMOL/L (ref 136–145)
TRIGL SERPL-MCNC: 144 MG/DL (ref 0–150)
TSH SERPL DL<=0.005 MIU/L-ACNC: 0.47 UIU/ML (ref 0.27–4.2)
VARIANT LYMPHS NFR BLD MANUAL: 4 % (ref 0–6)
VLDLC SERPL CALC-MCNC: 29 MG/DL
WBC # BLD AUTO: 7.3 K/UL (ref 4–11)

## 2023-11-14 RX ORDER — ROSUVASTATIN CALCIUM 20 MG/1
20 TABLET, COATED ORAL NIGHTLY
Qty: 30 TABLET | Refills: 5 | Status: SHIPPED | OUTPATIENT
Start: 2023-11-14

## 2024-04-30 ENCOUNTER — HOSPITAL ENCOUNTER (OUTPATIENT)
Dept: MRI IMAGING | Age: 64
Discharge: HOME | End: 2024-04-30
Attending: SPECIALIST
Payer: COMMERCIAL

## 2024-04-30 DIAGNOSIS — R97.20 ELEVATED PROSTATE SPECIFIC ANTIGEN (PSA): ICD-10-CM

## 2024-04-30 LAB
EGFR, POC: 74 ML/MIN/1.73M2
POC CREATININE: 1.1 MG/DL (ref 0.5–1.2)

## 2024-04-30 PROCEDURE — 72197 MRI PELVIS W/O & W/DYE: CPT

## 2024-04-30 PROCEDURE — 6360000004 HC RX CONTRAST MEDICATION: Performed by: SPECIALIST

## 2024-04-30 PROCEDURE — 82565 ASSAY OF CREATININE: CPT

## 2024-04-30 PROCEDURE — A9577 INJ MULTIHANCE: HCPCS | Performed by: SPECIALIST

## 2024-04-30 RX ADMIN — GADOBENATE DIMEGLUMINE 20 ML: 529 INJECTION, SOLUTION INTRAVENOUS at 11:46

## 2024-05-14 ENCOUNTER — OFFICE VISIT (OUTPATIENT)
Dept: INTERNAL MEDICINE CLINIC | Age: 64
End: 2024-05-14
Payer: COMMERCIAL

## 2024-05-14 VITALS
BODY MASS INDEX: 27.86 KG/M2 | WEIGHT: 194.6 LBS | RESPIRATION RATE: 16 BRPM | HEIGHT: 70 IN | SYSTOLIC BLOOD PRESSURE: 110 MMHG | OXYGEN SATURATION: 99 % | HEART RATE: 66 BPM | DIASTOLIC BLOOD PRESSURE: 68 MMHG

## 2024-05-14 DIAGNOSIS — E78.2 MIXED HYPERLIPIDEMIA: ICD-10-CM

## 2024-05-14 DIAGNOSIS — E78.2 MIXED HYPERLIPIDEMIA: Primary | ICD-10-CM

## 2024-05-14 DIAGNOSIS — R97.20 ELEVATED PSA: ICD-10-CM

## 2024-05-14 DIAGNOSIS — M54.50 ACUTE RIGHT-SIDED LOW BACK PAIN WITHOUT SCIATICA: ICD-10-CM

## 2024-05-14 DIAGNOSIS — E55.9 VITAMIN D DEFICIENCY: ICD-10-CM

## 2024-05-14 DIAGNOSIS — Z13.6 ENCOUNTER FOR ABDOMINAL AORTIC ANEURYSM (AAA) SCREENING: ICD-10-CM

## 2024-05-14 DIAGNOSIS — Z87.898 HISTORY OF PREDIABETES: ICD-10-CM

## 2024-05-14 PROCEDURE — 99214 OFFICE O/P EST MOD 30 MIN: CPT | Performed by: NURSE PRACTITIONER

## 2024-05-14 PROCEDURE — 1123F ACP DISCUSS/DSCN MKR DOCD: CPT | Performed by: NURSE PRACTITIONER

## 2024-05-14 PROCEDURE — 36415 COLL VENOUS BLD VENIPUNCTURE: CPT | Performed by: NURSE PRACTITIONER

## 2024-05-14 RX ORDER — PREDNISONE 10 MG/1
TABLET ORAL
Qty: 20 TABLET | Refills: 0 | Status: SHIPPED | OUTPATIENT
Start: 2024-05-14

## 2024-05-14 RX ORDER — PENTOXIFYLLINE 400 MG/1
TABLET, EXTENDED RELEASE ORAL
COMMUNITY
Start: 2024-03-27

## 2024-05-14 RX ORDER — TRAMADOL HYDROCHLORIDE 50 MG/1
50 TABLET ORAL EVERY 8 HOURS PRN
Qty: 15 TABLET | Refills: 0 | Status: SHIPPED | OUTPATIENT
Start: 2024-05-14 | End: 2024-05-19

## 2024-05-14 ASSESSMENT — PATIENT HEALTH QUESTIONNAIRE - PHQ9
2. FEELING DOWN, DEPRESSED OR HOPELESS: NOT AT ALL
SUM OF ALL RESPONSES TO PHQ QUESTIONS 1-9: 0
DEPRESSION UNABLE TO ASSESS: FUNCTIONAL CAPACITY MOTIVATION LIMITS ACCURACY
SUM OF ALL RESPONSES TO PHQ9 QUESTIONS 1 & 2: 0
SUM OF ALL RESPONSES TO PHQ QUESTIONS 1-9: 0
1. LITTLE INTEREST OR PLEASURE IN DOING THINGS: NOT AT ALL

## 2024-05-14 ASSESSMENT — ENCOUNTER SYMPTOMS
CONSTIPATION: 0
COUGH: 0
SINUS PAIN: 0
BACK PAIN: 1
COLOR CHANGE: 0
DIARRHEA: 0
CHEST TIGHTNESS: 0
SHORTNESS OF BREATH: 0
ABDOMINAL DISTENTION: 0
SORE THROAT: 0
WHEEZING: 0
SINUS PRESSURE: 0
NAUSEA: 0

## 2024-05-14 NOTE — PROGRESS NOTES
rash.   Neurological:  Negative for dizziness, light-headedness and numbness.   Hematological: Negative.    Psychiatric/Behavioral: Negative.         OBJECTIVE:    /68   Pulse 66   Resp 16   Ht 1.778 m (5' 10\")   Wt 88.3 kg (194 lb 9.6 oz)   SpO2 99%   BMI 27.92 kg/m²     Physical Exam  Constitutional:       General: He is not in acute distress.     Appearance: He is well-developed. He is not diaphoretic.   HENT:      Head: Normocephalic and atraumatic.   Neck:      Thyroid: No thyromegaly.   Cardiovascular:      Rate and Rhythm: Normal rate and regular rhythm.      Heart sounds: Normal heart sounds. No murmur heard.  Pulmonary:      Effort: Pulmonary effort is normal.      Breath sounds: Normal breath sounds.   Abdominal:      General: Bowel sounds are normal. There is no distension.      Palpations: Abdomen is soft.      Tenderness: There is no abdominal tenderness.   Musculoskeletal:         General: Normal range of motion.      Comments: High sensitivity Neuro Exam for Lumbar spine  -(L1-L2)  inner thigh sensation intact bilaterally  -(S3,4,5) No loss of bowel/bladder control     -Lower extremity ROM:       -L2: Adduct thighs       -L3/S1: extend/ flex knees       -L4: dorsiflex ankles       -L5: point great toes upward & plantar flex toes (S2)       -L2,3,4: Knee reflexes intact bilaterally   Lymphadenopathy:      Cervical: No cervical adenopathy.   Skin:     General: Skin is warm and dry.      Capillary Refill: Capillary refill takes less than 2 seconds.      Findings: No rash.   Neurological:      Mental Status: He is alert and oriented to person, place, and time.      GCS: GCS eye subscore is 4. GCS verbal subscore is 5. GCS motor subscore is 6.      Coordination: Coordination normal.   Psychiatric:         Behavior: Behavior normal.         Thought Content: Thought content normal.         ASSESSMENT:    1. Mixed hyperlipidemia    2. Elevated PSA    3. History of prediabetes    4. Vitamin D

## 2024-05-15 LAB
ALBUMIN SERPL-MCNC: 4.2 G/DL (ref 3.4–5)
ALBUMIN/GLOB SERPL: 1.5 {RATIO} (ref 1.1–2.2)
ALP SERPL-CCNC: 64 U/L (ref 40–129)
ALT SERPL-CCNC: 15 U/L (ref 10–40)
ANION GAP SERPL CALCULATED.3IONS-SCNC: 10 MMOL/L (ref 3–16)
AST SERPL-CCNC: 18 U/L (ref 15–37)
BILIRUB SERPL-MCNC: 0.3 MG/DL (ref 0–1)
BUN SERPL-MCNC: 26 MG/DL (ref 7–20)
CALCIUM SERPL-MCNC: 9.7 MG/DL (ref 8.3–10.6)
CHLORIDE SERPL-SCNC: 104 MMOL/L (ref 99–110)
CHOLEST SERPL-MCNC: 198 MG/DL (ref 0–199)
CO2 SERPL-SCNC: 27 MMOL/L (ref 21–32)
CREAT SERPL-MCNC: 1.3 MG/DL (ref 0.8–1.3)
GFR SERPLBLD CREATININE-BSD FMLA CKD-EPI: 61 ML/MIN/{1.73_M2}
GLUCOSE SERPL-MCNC: 98 MG/DL (ref 70–99)
HDLC SERPL-MCNC: 51 MG/DL (ref 40–60)
LDL CHOLESTEROL: 128 MG/DL
POTASSIUM SERPL-SCNC: 4.4 MMOL/L (ref 3.5–5.1)
PROT SERPL-MCNC: 7 G/DL (ref 6.4–8.2)
SODIUM SERPL-SCNC: 141 MMOL/L (ref 136–145)
TRIGL SERPL-MCNC: 96 MG/DL (ref 0–150)
VLDLC SERPL CALC-MCNC: 19 MG/DL

## 2024-05-21 ENCOUNTER — HOSPITAL ENCOUNTER (OUTPATIENT)
Dept: ULTRASOUND IMAGING | Age: 64
Discharge: HOME | End: 2024-05-21
Payer: COMMERCIAL

## 2024-05-21 DIAGNOSIS — Z13.6 ENCOUNTER FOR ABDOMINAL AORTIC ANEURYSM (AAA) SCREENING: ICD-10-CM

## 2024-05-21 PROCEDURE — 93978 VASCULAR STUDY: CPT

## 2024-06-24 RX ORDER — ROSUVASTATIN CALCIUM 20 MG/1
20 TABLET, COATED ORAL NIGHTLY
Qty: 30 TABLET | Refills: 5 | Status: SHIPPED | OUTPATIENT
Start: 2024-06-24

## 2024-08-15 RX ORDER — PREDNISONE 10 MG/1
TABLET ORAL
Qty: 20 TABLET | Refills: 0 | Status: SHIPPED | OUTPATIENT
Start: 2024-08-15

## 2024-11-13 ENCOUNTER — OFFICE VISIT (OUTPATIENT)
Dept: INTERNAL MEDICINE CLINIC | Age: 64
End: 2024-11-13

## 2024-11-13 VITALS
BODY MASS INDEX: 28.95 KG/M2 | SYSTOLIC BLOOD PRESSURE: 114 MMHG | OXYGEN SATURATION: 98 % | HEART RATE: 82 BPM | DIASTOLIC BLOOD PRESSURE: 82 MMHG | RESPIRATION RATE: 22 BRPM | HEIGHT: 70 IN | WEIGHT: 202.2 LBS

## 2024-11-13 DIAGNOSIS — R97.20 ELEVATED PSA: ICD-10-CM

## 2024-11-13 DIAGNOSIS — G47.33 OSA (OBSTRUCTIVE SLEEP APNEA): ICD-10-CM

## 2024-11-13 DIAGNOSIS — E55.9 VITAMIN D DEFICIENCY: ICD-10-CM

## 2024-11-13 DIAGNOSIS — E78.2 MIXED HYPERLIPIDEMIA: Primary | ICD-10-CM

## 2024-11-13 DIAGNOSIS — R73.03 PRE-DIABETES: ICD-10-CM

## 2024-11-13 RX ORDER — ROSUVASTATIN CALCIUM 20 MG/1
20 TABLET, COATED ORAL NIGHTLY
Qty: 30 TABLET | Refills: 5 | Status: SHIPPED | OUTPATIENT
Start: 2024-11-13

## 2024-11-13 SDOH — ECONOMIC STABILITY: INCOME INSECURITY: HOW HARD IS IT FOR YOU TO PAY FOR THE VERY BASICS LIKE FOOD, HOUSING, MEDICAL CARE, AND HEATING?: NOT HARD AT ALL

## 2024-11-13 SDOH — ECONOMIC STABILITY: FOOD INSECURITY: WITHIN THE PAST 12 MONTHS, YOU WORRIED THAT YOUR FOOD WOULD RUN OUT BEFORE YOU GOT MONEY TO BUY MORE.: NEVER TRUE

## 2024-11-13 SDOH — ECONOMIC STABILITY: FOOD INSECURITY: WITHIN THE PAST 12 MONTHS, THE FOOD YOU BOUGHT JUST DIDN'T LAST AND YOU DIDN'T HAVE MONEY TO GET MORE.: NEVER TRUE

## 2024-11-13 ASSESSMENT — ENCOUNTER SYMPTOMS
SINUS PRESSURE: 0
ABDOMINAL PAIN: 0
SHORTNESS OF BREATH: 0
ABDOMINAL DISTENTION: 0
EYES NEGATIVE: 1
DIARRHEA: 0
CONSTIPATION: 0
COUGH: 0
NAUSEA: 0
SORE THROAT: 0
WHEEZING: 0
SINUS PAIN: 0
CHEST TIGHTNESS: 0
COLOR CHANGE: 0

## 2024-11-13 NOTE — PROGRESS NOTES
Raymond Rodriguez  1960 11/13/24    Chief Complaint   Patient presents with    6 Month Follow-Up       SUBJECTIVE:      6 month follow up:    Patient denies any chest pain, shortness of breath, myalgias, Patient is tolerating cholesterol medications without difficulty.  Continues on Crestor 20 mg daily. No side effects or complications noted.   Lab Results   Component Value Date     (H) 05/14/2024     Also reports hx of NEETU several years ago for which he did use CPAP, however, has not had follow up on this in several years. Does report his wife reports loud snoring at night.     The 10-year ASCVD risk score (Priscila URIARTE, et al., 2019) is: 11.1%    Values used to calculate the score:      Age: 64 years      Sex: Male      Is Non- : No      Diabetic: No      Tobacco smoker: No      Systolic Blood Pressure: 114 mmHg      Is BP treated: No      HDL Cholesterol: 51 mg/dL      Total Cholesterol: 198 mg/dL    Vit D- continues on 2000 iu daily.     Hemoglobin A1C   Date Value Ref Range Status   11/13/2023 5.6 See comment % Final     Comment:     Comment:  Diagnosis of Diabetes: > or = 6.5%  Increased risk of diabetes (Prediabetes): 5.7-6.4%  Glycemic Control: Nonpregnant Adults: <7.0%                    Pregnant: <6.0%         PSA noted to be previously elevated.Does see Dr Mckeon/Sandy for his peyronie's disease. . PSA still remains elevated so was referred back to Dr Mckeon and MRI of the prostate was completed. No suspicious nodules noted. Prostate volume was elevated. Returns on 5/22/24 for further evaluation. Does occasionally have weak stream.   Update: has follow up next week on 11/20.     Review of Systems   Constitutional:  Negative for activity change, appetite change, fatigue and fever.   HENT:  Negative for congestion, sinus pressure, sinus pain and sore throat.    Eyes: Negative.    Respiratory:  Negative for cough, chest tightness, shortness of breath and wheezing.

## 2024-11-18 DIAGNOSIS — R73.03 PRE-DIABETES: ICD-10-CM

## 2024-11-18 DIAGNOSIS — E78.2 MIXED HYPERLIPIDEMIA: ICD-10-CM

## 2024-11-18 DIAGNOSIS — E55.9 VITAMIN D DEFICIENCY: ICD-10-CM

## 2024-11-18 DIAGNOSIS — R97.20 ELEVATED PSA: ICD-10-CM

## 2024-11-18 LAB
ALBUMIN: 4 G/DL
ALP BLD-CCNC: 65 U/L
ALT SERPL-CCNC: 16 U/L
ANION GAP SERPL CALCULATED.3IONS-SCNC: NORMAL MMOL/L
AST SERPL-CCNC: 20 U/L
BILIRUB SERPL-MCNC: 0.5 MG/DL (ref 0.1–1.4)
BUN BLDV-MCNC: 31 MG/DL
CALCIUM SERPL-MCNC: 9.6 MG/DL
CHLORIDE BLD-SCNC: 107 MMOL/L
CHOLESTEROL, FASTING: 246
CO2: 20 MMOL/L
CREAT SERPL-MCNC: 1.39 MG/DL
ESTIMATED AVERAGE GLUCOSE: NORMAL
GFR, ESTIMATED: 56
GLUCOSE BLD-MCNC: 105 MG/DL
HBA1C MFR BLD: 6 %
HDLC SERPL-MCNC: 43 MG/DL (ref 35–70)
LDL CHOLESTEROL: 167
POTASSIUM SERPL-SCNC: 4.1 MMOL/L
PROSTATE SPECIFIC ANTIGEN: 5 NG/ML
SODIUM BLD-SCNC: 140 MMOL/L
TOTAL PROTEIN: 6.4 G/DL (ref 6.4–8.2)
TRIGLYCERIDE, FASTING: 195
VITAMIN D 25-HYDROXY: 25.9
VITAMIN D2, 25 HYDROXY: NORMAL
VITAMIN D3,25 HYDROXY: NORMAL

## 2024-11-19 DIAGNOSIS — E78.2 MIXED HYPERLIPIDEMIA: ICD-10-CM

## 2024-11-19 RX ORDER — ROSUVASTATIN CALCIUM 40 MG/1
40 TABLET, COATED ORAL NIGHTLY
Qty: 30 TABLET | Refills: 5 | Status: SHIPPED | OUTPATIENT
Start: 2024-11-19

## 2024-11-22 ENCOUNTER — HOSPITAL ENCOUNTER (OUTPATIENT)
Dept: SLEEP CENTER | Age: 64
Discharge: HOME | End: 2024-11-22
Payer: COMMERCIAL

## 2024-11-22 VITALS
WEIGHT: 195 LBS | HEIGHT: 70 IN | SYSTOLIC BLOOD PRESSURE: 146 MMHG | HEART RATE: 80 BPM | OXYGEN SATURATION: 100 % | DIASTOLIC BLOOD PRESSURE: 77 MMHG | BODY MASS INDEX: 27.92 KG/M2

## 2024-11-22 DIAGNOSIS — R06.83 SNORING: Primary | ICD-10-CM

## 2024-11-22 DIAGNOSIS — G47.33 OSA (OBSTRUCTIVE SLEEP APNEA): ICD-10-CM

## 2024-11-22 PROCEDURE — 99211 OFF/OP EST MAY X REQ PHY/QHP: CPT

## 2024-11-22 ASSESSMENT — SLEEP AND FATIGUE QUESTIONNAIRES
HOW LIKELY ARE YOU TO NOD OFF OR FALL ASLEEP IN A CAR, WHILE STOPPED FOR A FEW MINUTES IN TRAFFIC: WOULD NEVER DOZE
HOW LIKELY ARE YOU TO NOD OFF OR FALL ASLEEP WHILE SITTING QUIETLY AFTER LUNCH WITHOUT ALCOHOL: WOULD NEVER DOZE
HOW LIKELY ARE YOU TO NOD OFF OR FALL ASLEEP WHEN YOU ARE A PASSENGER IN A CAR FOR AN HOUR WITHOUT A BREAK: SLIGHT CHANCE OF DOZING
HOW LIKELY ARE YOU TO NOD OFF OR FALL ASLEEP WHILE LYING DOWN TO REST IN THE AFTERNOON WHEN CIRCUMSTANCES PERMIT: SLIGHT CHANCE OF DOZING
ESS TOTAL SCORE: 3
HOW LIKELY ARE YOU TO NOD OFF OR FALL ASLEEP WHILE SITTING INACTIVE IN A PUBLIC PLACE: WOULD NEVER DOZE
HOW LIKELY ARE YOU TO NOD OFF OR FALL ASLEEP WHILE SITTING AND READING: WOULD NEVER DOZE
HOW LIKELY ARE YOU TO NOD OFF OR FALL ASLEEP WHILE SITTING AND TALKING TO SOMEONE: WOULD NEVER DOZE
HOW LIKELY ARE YOU TO NOD OFF OR FALL ASLEEP WHILE WATCHING TV: SLIGHT CHANCE OF DOZING

## 2024-11-22 NOTE — PROGRESS NOTES
[facility]      Jerry Corona MD, FACP, [facility]  Jonny Kelly MD, [facility]  Anson Trimble MD, [facility]  Brenda Garrido DO  Keila Garner DO      [address]el [address]  [address]  [place] [address]   PH: [phone]  F: [phone]     Subjective:     Patient ID: Raymond Rodriguez is a 64 y.o. male, referred to the sleep center for consultation with a sleep specialist.     Reason for Consultation/Chief Complaint:   Chief Complaint   Patient presents with    G47.33       Referring physician:  Nabeel SQUIRES    Symptoms:   [x]  Snoring                                                               [x]  Dry Mouth  [x]  Choking                                                              []  Morning Headaches  []  Gasping for Air                                                   []  Trouble Falling asleep  []  Tired during the daytime                                    []  Trouble Staying Asleep  []  Tired when you wake up                                    []  Weight Gain in Last 5 Years  []  Wake up frequently at night                              []  Weight Loss in Last 5 Years  []  Shortness Of Breath                                          []  Shift Worker  []  Coughing                                                            [x]  Smoker (Previous or Current)  []  Chest Pain                                                          []  Anxiety  []  Trouble keeping your legs still at night              []  Depression  []  Kicking your legs in your sleep                         []  Insomnia            []  Other:     Significant Co-morbidities:  []  Congestive Heart Failure     []  COPD         []  Stroke (Past 30 Days)      []  Supplemental Oxygen Usage       []  Cognitive Impairment      []  Neuromuscular Problems  []  Epilepsy/Neurological Disorders         Duration of Sleep Problems:    History:H/O NEETU in the past,stopped using the Cpap.    Social

## 2024-12-19 ENCOUNTER — HOSPITAL ENCOUNTER (OUTPATIENT)
Dept: SLEEP CENTER | Age: 64
Discharge: HOME | End: 2024-12-19
Attending: INTERNAL MEDICINE
Payer: COMMERCIAL

## 2024-12-19 DIAGNOSIS — R06.83 SNORING: ICD-10-CM

## 2024-12-19 DIAGNOSIS — G47.33 OSA (OBSTRUCTIVE SLEEP APNEA): ICD-10-CM

## 2024-12-19 PROCEDURE — G0399 HOME SLEEP TEST/TYPE 3 PORTA: HCPCS

## 2024-12-19 NOTE — PROGRESS NOTES
Raymond Rodriguez  1960  arrived at Sleep Center on 12/19/2024 for set up and instruction of home sleep study with the [facility] unit.  he was instructed on proper set-up and operation of HST unit. Written instructions with set up diagram given for reference and reinforcement of verbal instruction and demonstration. he was able to return demonstration appropriately. No home environment, vision, dexterity, comprehension concerns with this patient based on completed forms and patient interactions. Patient will return unit after one night as instructed.    Electronically signed by Arjun Lama on 12/19/2024 at 3:29 PM

## 2025-01-03 ENCOUNTER — HOSPITAL ENCOUNTER (OUTPATIENT)
Dept: SLEEP CENTER | Age: 65
Discharge: HOME | End: 2025-01-03
Payer: COMMERCIAL

## 2025-01-03 DIAGNOSIS — G47.33 OSA (OBSTRUCTIVE SLEEP APNEA): Primary | ICD-10-CM

## 2025-01-03 PROCEDURE — 99211 OFF/OP EST MAY X REQ PHY/QHP: CPT

## 2025-01-03 NOTE — PROGRESS NOTES
Session: Not on file   Stress: Not on file   Social Connections: Not on file   Intimate Partner Violence: Not At Risk (11/10/2023)    Humiliation, Afraid, Rape, and Kick questionnaire     Fear of Current or Ex-Partner: No     Emotionally Abused: No     Physically Abused: No     Sexually Abused: No   Housing Stability: Unknown (11/13/2024)    Housing Stability Vital Sign     Unable to Pay for Housing in the Last Year: Not on file     Number of Times Moved in the Last Year: Not on file     Homeless in the Last Year: No       Prior to Admission medications    Medication Sig Start Date End Date Taking? Authorizing Provider   rosuvastatin (CRESTOR) 40 MG tablet Take 1 tablet by mouth nightly 11/19/24  Yes Cheng Demarco APRN - CNP   Cholecalciferol (VITAMIN D3) 2000 UNITS CAPS Take by mouth Daily   Yes Provider, MD Constantino   pentoxifylline (TRENTAL) 400 MG extended release tablet  3/27/24   Provider, Constantino, MD       Allergies as of 01/03/2025 - Fully Reviewed 01/03/2025   Allergen Reaction Noted    Zocor [simvastatin] Other (See Comments)        Patient Active Problem List   Diagnosis    NEETU (obstructive sleep apnea)    Hyperlipidemia    Vitamin D deficiency    Elevated PSA    Pre-diabetes    Snoring       Past Medical History:   Diagnosis Date    Allergic rhinitis     Family history of type 2 diabetes mellitus     father    Hyperlipidemia     Sleep apnea, obstructive     on CPAP 8cm       Past Surgical History:   Procedure Laterality Date    COLONOSCOPY  3/4/16    internal hemorrhoids       Family History   Problem Relation Age of Onset    Diabetes Mother     Other Father         d. age 75 from complications from auto accident         Objective:     There were no vitals filed for this visit.  Neck -    Inches  [place] -      []  Counseling for smoking cessation       Assessment:      Diagnosis:  Severe NEETU.     Plan:      1.Arrange Apap 5/20.  2.RTO 3 mths.  3.Sleep hygiene.        Orders Placed This Encounter

## 2025-01-14 DIAGNOSIS — E78.2 MIXED HYPERLIPIDEMIA: ICD-10-CM

## 2025-01-14 RX ORDER — ROSUVASTATIN CALCIUM 40 MG/1
40 TABLET, COATED ORAL NIGHTLY
Qty: 30 TABLET | Refills: 5 | Status: SHIPPED | OUTPATIENT
Start: 2025-01-14

## 2025-02-11 ENCOUNTER — OFFICE VISIT (OUTPATIENT)
Dept: INTERNAL MEDICINE CLINIC | Age: 65
End: 2025-02-11
Payer: COMMERCIAL

## 2025-02-11 VITALS
OXYGEN SATURATION: 100 % | SYSTOLIC BLOOD PRESSURE: 114 MMHG | HEIGHT: 70 IN | RESPIRATION RATE: 16 BRPM | DIASTOLIC BLOOD PRESSURE: 72 MMHG | WEIGHT: 206.1 LBS | HEART RATE: 78 BPM | BODY MASS INDEX: 29.51 KG/M2

## 2025-02-11 DIAGNOSIS — M54.50 MIDLINE LOW BACK PAIN WITHOUT SCIATICA, UNSPECIFIED CHRONICITY: Primary | ICD-10-CM

## 2025-02-11 PROCEDURE — 99213 OFFICE O/P EST LOW 20 MIN: CPT | Performed by: NURSE PRACTITIONER

## 2025-02-11 PROCEDURE — G2211 COMPLEX E/M VISIT ADD ON: HCPCS | Performed by: NURSE PRACTITIONER

## 2025-02-11 PROCEDURE — 1123F ACP DISCUSS/DSCN MKR DOCD: CPT | Performed by: NURSE PRACTITIONER

## 2025-02-11 RX ORDER — HYDROCODONE BITARTRATE AND ACETAMINOPHEN 5; 325 MG/1; MG/1
1 TABLET ORAL EVERY 8 HOURS PRN
Qty: 21 TABLET | Refills: 0 | Status: SHIPPED | OUTPATIENT
Start: 2025-02-11 | End: 2025-02-18

## 2025-02-11 RX ORDER — PREDNISONE 10 MG/1
TABLET ORAL
Qty: 20 TABLET | Refills: 0 | Status: SHIPPED | OUTPATIENT
Start: 2025-02-11

## 2025-02-11 SDOH — ECONOMIC STABILITY: FOOD INSECURITY: WITHIN THE PAST 12 MONTHS, THE FOOD YOU BOUGHT JUST DIDN'T LAST AND YOU DIDN'T HAVE MONEY TO GET MORE.: NEVER TRUE

## 2025-02-11 SDOH — ECONOMIC STABILITY: FOOD INSECURITY: WITHIN THE PAST 12 MONTHS, YOU WORRIED THAT YOUR FOOD WOULD RUN OUT BEFORE YOU GOT MONEY TO BUY MORE.: NEVER TRUE

## 2025-02-11 ASSESSMENT — PATIENT HEALTH QUESTIONNAIRE - PHQ9
SUM OF ALL RESPONSES TO PHQ QUESTIONS 1-9: 0
SUM OF ALL RESPONSES TO PHQ QUESTIONS 1-9: 0
2. FEELING DOWN, DEPRESSED OR HOPELESS: NOT AT ALL
SUM OF ALL RESPONSES TO PHQ QUESTIONS 1-9: 0
DEPRESSION UNABLE TO ASSESS: FUNCTIONAL CAPACITY MOTIVATION LIMITS ACCURACY
SUM OF ALL RESPONSES TO PHQ9 QUESTIONS 1 & 2: 0
1. LITTLE INTEREST OR PLEASURE IN DOING THINGS: NOT AT ALL
SUM OF ALL RESPONSES TO PHQ QUESTIONS 1-9: 0

## 2025-02-11 ASSESSMENT — ENCOUNTER SYMPTOMS
BACK PAIN: 1
ABDOMINAL PAIN: 0
SORE THROAT: 0
ABDOMINAL DISTENTION: 0
SINUS PAIN: 0
COUGH: 0
WHEEZING: 0
SHORTNESS OF BREATH: 0
CHEST TIGHTNESS: 0
EYES NEGATIVE: 1
SINUS PRESSURE: 0
CONSTIPATION: 0
NAUSEA: 0
COLOR CHANGE: 0
DIARRHEA: 0

## 2025-02-11 NOTE — PROGRESS NOTES
Raymond Rodriguez  1960 02/11/25    Chief Complaint   Patient presents with    Lower Back Pain     Sx started 2 weeks ago. Pt denies any known injury        SUBJECTIVE:      Raymond presents to the office this afternoon for c/o lower back pain which has been ongoing over the last 2-3 weeks. Denies any injuries or known falls/trauma.  Has been using an old script for Tramadol with modest benefit. Also tried using one of his wife's muscle relaxers which did not provide benefit. Denies any loss of bowel/bladder control, saddle anesthesia, or other red flag warning signs. The pain does not radiate down either lower extremity.     Review of Systems   Constitutional:  Negative for activity change, appetite change, fatigue and fever.   HENT:  Negative for congestion, sinus pressure, sinus pain and sore throat.    Eyes: Negative.    Respiratory:  Negative for cough, chest tightness, shortness of breath and wheezing.    Cardiovascular:  Negative for chest pain and palpitations.   Gastrointestinal:  Negative for abdominal distention, abdominal pain, constipation, diarrhea and nausea.   Genitourinary:  Negative for difficulty urinating, dysuria, flank pain, frequency and hematuria.   Musculoskeletal:  Positive for back pain. Negative for arthralgias, gait problem, joint swelling and myalgias.   Skin:  Negative for color change and rash.   Neurological:  Negative for dizziness, light-headedness and numbness.   Hematological: Negative.    Psychiatric/Behavioral: Negative.         OBJECTIVE:    /72   Pulse 78   Resp 16   Ht 1.778 m (5' 10\")   Wt 93.5 kg (206 lb 1.6 oz)   SpO2 100%   BMI 29.57 kg/m²     Physical Exam  Constitutional:       General: He is not in acute distress.     Appearance: He is well-developed. He is not diaphoretic.   HENT:      Head: Normocephalic and atraumatic.   Neck:      Thyroid: No thyromegaly.   Cardiovascular:      Rate and Rhythm: Normal rate and regular rhythm.      Heart sounds:

## 2025-02-12 ENCOUNTER — HOSPITAL ENCOUNTER (OUTPATIENT)
Age: 65
Discharge: HOME | End: 2025-02-12
Payer: COMMERCIAL

## 2025-02-12 ENCOUNTER — HOSPITAL ENCOUNTER (OUTPATIENT)
Dept: GENERAL RADIOLOGY | Age: 65
Discharge: HOME | End: 2025-02-12
Payer: COMMERCIAL

## 2025-02-12 DIAGNOSIS — M54.50 MIDLINE LOW BACK PAIN WITHOUT SCIATICA, UNSPECIFIED CHRONICITY: ICD-10-CM

## 2025-02-12 PROCEDURE — 72100 X-RAY EXAM L-S SPINE 2/3 VWS: CPT

## 2025-02-12 PROCEDURE — 72120 X-RAY BEND ONLY L-S SPINE: CPT

## 2025-02-24 ENCOUNTER — TELEMEDICINE (OUTPATIENT)
Dept: INTERNAL MEDICINE CLINIC | Age: 65
End: 2025-02-24
Payer: COMMERCIAL

## 2025-02-24 DIAGNOSIS — R68.89 FLU-LIKE SYMPTOMS: Primary | ICD-10-CM

## 2025-02-24 PROCEDURE — 1123F ACP DISCUSS/DSCN MKR DOCD: CPT | Performed by: NURSE PRACTITIONER

## 2025-02-24 PROCEDURE — 99213 OFFICE O/P EST LOW 20 MIN: CPT | Performed by: NURSE PRACTITIONER

## 2025-02-24 RX ORDER — BENZONATATE 200 MG/1
200 CAPSULE ORAL 3 TIMES DAILY PRN
Qty: 30 CAPSULE | Refills: 0 | Status: SHIPPED | OUTPATIENT
Start: 2025-02-24 | End: 2025-03-06

## 2025-02-24 RX ORDER — PREDNISONE 10 MG/1
TABLET ORAL
Qty: 20 TABLET | Refills: 0 | Status: SHIPPED | OUTPATIENT
Start: 2025-02-24

## 2025-02-24 SDOH — ECONOMIC STABILITY: FOOD INSECURITY: WITHIN THE PAST 12 MONTHS, YOU WORRIED THAT YOUR FOOD WOULD RUN OUT BEFORE YOU GOT MONEY TO BUY MORE.: NEVER TRUE

## 2025-02-24 SDOH — ECONOMIC STABILITY: FOOD INSECURITY: WITHIN THE PAST 12 MONTHS, THE FOOD YOU BOUGHT JUST DIDN'T LAST AND YOU DIDN'T HAVE MONEY TO GET MORE.: NEVER TRUE

## 2025-02-24 ASSESSMENT — ENCOUNTER SYMPTOMS
SHORTNESS OF BREATH: 0
RHINORRHEA: 1
SORE THROAT: 0
CONSTIPATION: 0
WHEEZING: 0
CHEST TIGHTNESS: 0
EYES NEGATIVE: 1
ABDOMINAL DISTENTION: 0
DIARRHEA: 0
NAUSEA: 0
SINUS PAIN: 0
SINUS PRESSURE: 1
COLOR CHANGE: 0
COUGH: 1
ABDOMINAL PAIN: 0

## 2025-02-24 NOTE — PROGRESS NOTES
2025    TELEHEALTH EVALUATION -- Audio/Visual    HPI:    Raymond Rodriguez (:  1960) has requested an audio/video evaluation for the following concern(s):    Mr Rodriguez presents to the office this afternoon for c/o worsening cough/chest congestion over the last four days. Has been taking tylenol and Mucinex with modest benefit. Denies any wheezing, sob, fevers, or chills. No GI distress. He does feel he is slowly improving over the last 24-48 hours.     Review of Systems   Constitutional:  Negative for activity change, appetite change, fatigue and fever.   HENT:  Positive for congestion, postnasal drip, rhinorrhea and sinus pressure. Negative for sinus pain and sore throat.    Eyes: Negative.    Respiratory:  Positive for cough. Negative for chest tightness, shortness of breath and wheezing.    Cardiovascular:  Negative for chest pain and palpitations.   Gastrointestinal:  Negative for abdominal distention, abdominal pain, constipation, diarrhea and nausea.   Genitourinary:  Negative for difficulty urinating, dysuria, flank pain, frequency and hematuria.   Musculoskeletal:  Negative for arthralgias, gait problem, joint swelling and myalgias.   Skin:  Negative for color change and rash.   Neurological:  Negative for dizziness, light-headedness and numbness.   Hematological: Negative.    Psychiatric/Behavioral: Negative.         Prior to Visit Medications    Medication Sig Taking? Authorizing Provider   predniSONE (DELTASONE) 10 MG tablet Take 4 tablets daily for 2 days, then 3 tablets for 2 days, 2 tablets for 2 days, then 1 tablet for 2 days Yes Cheng Demarco APRN - CNP   benzonatate (TESSALON) 200 MG capsule Take 1 capsule by mouth 3 times daily as needed for Cough Yes Cheng Demarco APRN - CNP   rosuvastatin (CRESTOR) 40 MG tablet Take 1 tablet by mouth nightly Yes Cheng Demarco APRN - CNP   Cholecalciferol (VITAMIN D3) 2000 UNITS CAPS Take by mouth Daily Yes Provider, MD Constantino       Social

## 2025-05-09 DIAGNOSIS — E78.2 MIXED HYPERLIPIDEMIA: Primary | ICD-10-CM

## 2025-05-09 DIAGNOSIS — R73.03 PRE-DIABETES: ICD-10-CM

## 2025-05-12 ENCOUNTER — HOSPITAL ENCOUNTER (OUTPATIENT)
Age: 65
Discharge: HOME | End: 2025-05-12
Payer: COMMERCIAL

## 2025-05-12 ENCOUNTER — RESULTS FOLLOW-UP (OUTPATIENT)
Dept: INTERNAL MEDICINE CLINIC | Age: 65
End: 2025-05-12

## 2025-05-12 DIAGNOSIS — R73.03 PRE-DIABETES: ICD-10-CM

## 2025-05-12 DIAGNOSIS — E78.2 MIXED HYPERLIPIDEMIA: ICD-10-CM

## 2025-05-12 LAB
ALBUMIN SERPL-MCNC: 4 G/DL (ref 3.4–5)
ALBUMIN/GLOB SERPL: 1.5 {RATIO} (ref 1.1–2.2)
ALP SERPL-CCNC: 70 U/L (ref 40–129)
ALT SERPL-CCNC: 27 U/L (ref 10–40)
ANION GAP SERPL CALCULATED.3IONS-SCNC: 11 MMOL/L (ref 9–17)
AST SERPL-CCNC: 30 U/L (ref 15–37)
BILIRUB SERPL-MCNC: 0.6 MG/DL (ref 0–1)
BUN SERPL-MCNC: 22 MG/DL (ref 7–20)
CALCIUM SERPL-MCNC: 8.9 MG/DL (ref 8.3–10.6)
CHLORIDE SERPL-SCNC: 105 MMOL/L (ref 99–110)
CHOLEST SERPL-MCNC: 133 MG/DL (ref 125–199)
CO2 SERPL-SCNC: 23 MMOL/L (ref 21–32)
CREAT SERPL-MCNC: 1.3 MG/DL (ref 0.8–1.3)
EST. AVERAGE GLUCOSE BLD GHB EST-MCNC: 125 MG/DL
GFR, ESTIMATED: 57 ML/MIN/1.73M2
GLUCOSE SERPL-MCNC: 91 MG/DL (ref 74–99)
HBA1C MFR BLD: 6 % (ref 4.2–6.3)
HDLC SERPL-MCNC: 47 MG/DL
LDLC SERPL CALC-MCNC: 71 MG/DL
POTASSIUM SERPL-SCNC: 3.9 MMOL/L (ref 3.5–5.1)
PROT SERPL-MCNC: 6.7 G/DL (ref 6.4–8.2)
PSA SERPL-MCNC: 3.67 NG/ML (ref 0–4)
SODIUM SERPL-SCNC: 140 MMOL/L (ref 136–145)
TRIGL SERPL-MCNC: 75 MG/DL

## 2025-05-12 PROCEDURE — 83036 HEMOGLOBIN GLYCOSYLATED A1C: CPT

## 2025-05-12 PROCEDURE — G0103 PSA SCREENING: HCPCS

## 2025-05-12 PROCEDURE — 80053 COMPREHEN METABOLIC PANEL: CPT

## 2025-05-12 PROCEDURE — 80061 LIPID PANEL: CPT

## 2025-05-13 ENCOUNTER — OFFICE VISIT (OUTPATIENT)
Dept: INTERNAL MEDICINE CLINIC | Age: 65
End: 2025-05-13
Payer: COMMERCIAL

## 2025-05-13 VITALS
HEART RATE: 61 BPM | HEIGHT: 70 IN | DIASTOLIC BLOOD PRESSURE: 66 MMHG | SYSTOLIC BLOOD PRESSURE: 112 MMHG | BODY MASS INDEX: 29.49 KG/M2 | OXYGEN SATURATION: 100 % | WEIGHT: 206 LBS | RESPIRATION RATE: 20 BRPM

## 2025-05-13 DIAGNOSIS — E78.2 MIXED HYPERLIPIDEMIA: ICD-10-CM

## 2025-05-13 DIAGNOSIS — R73.03 PRE-DIABETES: Primary | ICD-10-CM

## 2025-05-13 DIAGNOSIS — G47.33 OSA (OBSTRUCTIVE SLEEP APNEA): ICD-10-CM

## 2025-05-13 DIAGNOSIS — E55.9 VITAMIN D DEFICIENCY: ICD-10-CM

## 2025-05-13 PROCEDURE — 99214 OFFICE O/P EST MOD 30 MIN: CPT | Performed by: NURSE PRACTITIONER

## 2025-05-13 PROCEDURE — G2211 COMPLEX E/M VISIT ADD ON: HCPCS | Performed by: NURSE PRACTITIONER

## 2025-05-13 PROCEDURE — 1123F ACP DISCUSS/DSCN MKR DOCD: CPT | Performed by: NURSE PRACTITIONER

## 2025-05-13 ASSESSMENT — ENCOUNTER SYMPTOMS
SORE THROAT: 0
SHORTNESS OF BREATH: 0
CONSTIPATION: 0
SINUS PRESSURE: 0
SINUS PAIN: 0
NAUSEA: 0
ABDOMINAL DISTENTION: 0
CHEST TIGHTNESS: 0
DIARRHEA: 0
WHEEZING: 0
EYES NEGATIVE: 1
ABDOMINAL PAIN: 0
COLOR CHANGE: 0
COUGH: 0

## 2025-05-13 NOTE — PROGRESS NOTES
Raymond JORGENSEN Dedarlineestrellita  1960 05/13/25    Chief Complaint   Patient presents with    6 Month Follow-Up       SUBJECTIVE:      6 month follow up:    Patient denies any chest pain, shortness of breath, myalgias, Patient is tolerating cholesterol medications without difficulty.  Continues on Crestor 20 mg daily.   Lab Results   Component Value Date    LDL 71 05/12/2025     The 10-year ASCVD risk score (Priscila URIARTE, et al., 2019) is: 8.7%    Values used to calculate the score:      Age: 64 years      Sex: Male      Is Non- : No      Diabetic: No      Tobacco smoker: No      Systolic Blood Pressure: 112 mmHg      Is BP treated: No      HDL Cholesterol: 47 mg/dL      Total Cholesterol: 133 mg/dL    Hemoglobin A1C   Date Value Ref Range Status   05/12/2025 6.0 4.2 - 6.3 % Final     NEETU- compliant with CPAP which he has restarted since our last visit. Since then he does feel his sleep is more restorative and energy better. Recent data eval 4/15/25 reports as reassuring.     Continues on 2000 iu of Vit D daily.     Had recent lab work completed for Dr Mckeon, urology, recently and PSA was decreased 3.67.     -repeat c-scope due 03/2026.    Review of Systems   Constitutional:  Negative for activity change, appetite change, fatigue and fever.   HENT:  Negative for congestion, sinus pressure, sinus pain and sore throat.    Eyes: Negative.    Respiratory:  Negative for cough, chest tightness, shortness of breath and wheezing.    Cardiovascular:  Negative for chest pain and palpitations.   Gastrointestinal:  Negative for abdominal distention, abdominal pain, constipation, diarrhea and nausea.   Genitourinary:  Negative for difficulty urinating, dysuria, flank pain, frequency and hematuria.   Musculoskeletal:  Negative for arthralgias, gait problem, joint swelling and myalgias.   Skin:  Negative for color change and rash.   Neurological:  Negative for dizziness, light-headedness and numbness.   Hematological:

## 2025-06-10 DIAGNOSIS — E78.2 MIXED HYPERLIPIDEMIA: ICD-10-CM

## 2025-06-10 RX ORDER — ROSUVASTATIN CALCIUM 40 MG/1
40 TABLET, COATED ORAL NIGHTLY
Qty: 30 TABLET | Refills: 5 | Status: SHIPPED | OUTPATIENT
Start: 2025-06-10 | End: 2025-06-10 | Stop reason: SDUPTHER

## 2025-06-10 RX ORDER — ROSUVASTATIN CALCIUM 40 MG/1
40 TABLET, COATED ORAL NIGHTLY
Qty: 30 TABLET | Refills: 5 | Status: SHIPPED | OUTPATIENT
Start: 2025-06-10